# Patient Record
Sex: MALE | Race: WHITE | NOT HISPANIC OR LATINO | URBAN - METROPOLITAN AREA
[De-identification: names, ages, dates, MRNs, and addresses within clinical notes are randomized per-mention and may not be internally consistent; named-entity substitution may affect disease eponyms.]

---

## 2017-01-14 ENCOUNTER — APPOINTMENT (RX ONLY)
Dept: URBAN - METROPOLITAN AREA CLINIC 343 | Facility: CLINIC | Age: 72
Setting detail: DERMATOLOGY
End: 2017-01-14

## 2017-01-14 DIAGNOSIS — Z85.828 PERSONAL HISTORY OF OTHER MALIGNANT NEOPLASM OF SKIN: ICD-10-CM

## 2017-01-14 DIAGNOSIS — L82.1 OTHER SEBORRHEIC KERATOSIS: ICD-10-CM

## 2017-01-14 DIAGNOSIS — Z87.2 PERSONAL HISTORY OF DISEASES OF THE SKIN AND SUBCUTANEOUS TISSUE: ICD-10-CM

## 2017-01-14 DIAGNOSIS — L82.0 INFLAMED SEBORRHEIC KERATOSIS: ICD-10-CM

## 2017-01-14 DIAGNOSIS — D22 MELANOCYTIC NEVI: ICD-10-CM

## 2017-01-14 DIAGNOSIS — L57.0 ACTINIC KERATOSIS: ICD-10-CM

## 2017-01-14 PROBLEM — D22.5 MELANOCYTIC NEVI OF TRUNK: Status: ACTIVE | Noted: 2017-01-14

## 2017-01-14 PROBLEM — D48.5 NEOPLASM OF UNCERTAIN BEHAVIOR OF SKIN: Status: ACTIVE | Noted: 2017-01-14

## 2017-01-14 PROBLEM — I10 ESSENTIAL (PRIMARY) HYPERTENSION: Status: ACTIVE | Noted: 2017-01-14

## 2017-01-14 PROCEDURE — 99213 OFFICE O/P EST LOW 20 MIN: CPT | Mod: 25

## 2017-01-14 PROCEDURE — 11101: CPT

## 2017-01-14 PROCEDURE — 11100: CPT | Mod: 59

## 2017-01-14 PROCEDURE — 17003 DESTRUCT PREMALG LES 2-14: CPT

## 2017-01-14 PROCEDURE — 17000 DESTRUCT PREMALG LESION: CPT | Mod: 59

## 2017-01-14 PROCEDURE — ? COUNSELING

## 2017-01-14 PROCEDURE — 17110 DESTRUCTION B9 LES UP TO 14: CPT

## 2017-01-14 PROCEDURE — ? BIOPSY BY SHAVE METHOD

## 2017-01-14 PROCEDURE — ? LIQUID NITROGEN

## 2017-01-14 ASSESSMENT — LOCATION SIMPLE DESCRIPTION DERM
LOCATION SIMPLE: LEFT FOREHEAD
LOCATION SIMPLE: LEFT ELBOW
LOCATION SIMPLE: RIGHT FOREHEAD
LOCATION SIMPLE: LEFT POPLITEAL SKIN
LOCATION SIMPLE: ABDOMEN
LOCATION SIMPLE: RIGHT EYEBROW
LOCATION SIMPLE: SCALP
LOCATION SIMPLE: LEFT SCALP

## 2017-01-14 ASSESSMENT — LOCATION ZONE DERM
LOCATION ZONE: FACE
LOCATION ZONE: ARM
LOCATION ZONE: SCALP
LOCATION ZONE: TRUNK
LOCATION ZONE: LEG

## 2017-01-14 ASSESSMENT — LOCATION DETAILED DESCRIPTION DERM
LOCATION DETAILED: LEFT INFERIOR FOREHEAD
LOCATION DETAILED: RIGHT INFERIOR LATERAL FOREHEAD
LOCATION DETAILED: LEFT SUPERIOR FOREHEAD
LOCATION DETAILED: LEFT ELBOW
LOCATION DETAILED: EPIGASTRIC SKIN
LOCATION DETAILED: RIGHT LATERAL EYEBROW
LOCATION DETAILED: PERIUMBILICAL SKIN
LOCATION DETAILED: LEFT POPLITEAL SKIN
LOCATION DETAILED: RIGHT SUPERIOR FOREHEAD
LOCATION DETAILED: LEFT SUPERIOR PARIETAL SCALP
LOCATION DETAILED: LEFT MEDIAL FRONTAL SCALP

## 2017-01-14 NOTE — PROCEDURE: BIOPSY BY SHAVE METHOD
Body Location Override (Optional - Billing Will Still Be Based On Selected Body Map Location If Applicable): left lateral nasal ala

## 2017-01-14 NOTE — PROCEDURE: BIOPSY BY SHAVE METHOD
Body Location Override (Optional - Billing Will Still Be Based On Selected Body Map Location If Applicable): left posterior lateral thigh close to popliteal fossa

## 2017-01-14 NOTE — PROCEDURE: BIOPSY BY SHAVE METHOD
Body Location Override (Optional - Billing Will Still Be Based On Selected Body Map Location If Applicable): left shoulder

## 2017-01-14 NOTE — PROCEDURE: LIQUID NITROGEN
Number Of Freeze-Thaw Cycles: 1 freeze-thaw cycle
Include Z78.9 (Other Specified Conditions Influencing Health Status) As An Associated Diagnosis?: No
Medical Necessity Clause: This procedure was medically necessary because the lesions that were treated were:
Medical Necessity Information: It is in your best interest to select a reason for this procedure from the list below. All of these items fulfill various CMS LCD requirements except the new and changing color options.
Post-Care Instructions: I reviewed with the patient in detail post-care instructions. Patient is to wear sunprotection, and avoid picking at any of the treated lesions. Pt may apply Vaseline to crusted or scabbing areas.
Render Post-Care Instructions In Note?: yes
Detail Level: Detailed
Consent: The patient's consent was obtained including but not limited to risks of crusting, scabbing, blistering, scarring, darker or lighter pigmentary change, recurrence, incomplete removal and infection.
Duration Of Freeze Thaw-Cycle (Seconds): 5

## 2017-01-14 NOTE — PROCEDURE: BIOPSY BY SHAVE METHOD
Post-Care Instructions: I reviewed with the patient in detail post-care instructions. Patient is to keep the biopsy site dry overnight, and then apply Aquaphor ointment twice daily until healed.

## 2017-02-07 ENCOUNTER — APPOINTMENT (RX ONLY)
Dept: URBAN - METROPOLITAN AREA CLINIC 343 | Facility: CLINIC | Age: 72
Setting detail: DERMATOLOGY
End: 2017-02-07

## 2017-02-07 DIAGNOSIS — L57.0 ACTINIC KERATOSIS: ICD-10-CM

## 2017-02-07 PROBLEM — C44.619 BASAL CELL CARCINOMA OF SKIN OF LEFT UPPER LIMB, INCLUDING SHOULDER: Status: ACTIVE | Noted: 2017-02-07

## 2017-02-07 PROBLEM — I10 ESSENTIAL (PRIMARY) HYPERTENSION: Status: ACTIVE | Noted: 2017-02-07

## 2017-02-07 PROCEDURE — ? LIQUID NITROGEN

## 2017-02-07 PROCEDURE — 17000 DESTRUCT PREMALG LESION: CPT

## 2017-02-07 PROCEDURE — 17262 DSTRJ MAL LES T/A/L 1.1-2.0: CPT | Mod: 59

## 2017-02-07 PROCEDURE — ? CURETTAGE AND DESTRUCTION

## 2017-02-07 ASSESSMENT — LOCATION SIMPLE DESCRIPTION DERM: LOCATION SIMPLE: LEFT NOSE

## 2017-02-07 ASSESSMENT — LOCATION DETAILED DESCRIPTION DERM: LOCATION DETAILED: LEFT NASAL ALA

## 2017-02-07 ASSESSMENT — LOCATION ZONE DERM: LOCATION ZONE: NOSE

## 2017-02-07 NOTE — PROCEDURE: LIQUID NITROGEN
Duration Of Freeze Thaw-Cycle (Seconds): 5
Render Post-Care Instructions In Note?: no
Consent: The patient's consent was obtained including but not limited to risks of crusting, scabbing, blistering, scarring, darker or lighter pigmentary change, recurrence, incomplete removal and infection.
Detail Level: Detailed
Post-Care Instructions: I reviewed with the patient in detail post-care instructions. Patient is to wear sunprotection, and avoid picking at any of the treated lesions. Pt may apply Vaseline to crusted or scabbing areas.

## 2017-02-07 NOTE — PROCEDURE: CURETTAGE AND DESTRUCTION
Render Post-Care Instructions In Note?: yes
Post-Care Instructions: I reviewed with the patient in detail post-care instructions. Patient is to keep the area dry for 48 hours, and not to engage in any swimming until the area is healed. Should the patient develop any fevers, chills, bleeding, severe pain patient will contact the office immediately.
What Was Performed First?: Curettage
Anesthesia Type: 1% lidocaine with epinephrine
Cautery Type: electrodesiccation
Bill For Surgical Tray: no
Size Of Lesion In Cm: 0.7
Number Of Curettages: 3
Additional Information: (Optional): The wound was cleaned, Aquaphor ointment and bandage was applied.  The patient received detailed post-op instructions.
Size Of Lesion After Curettage: 1.4
Bill As A Line Item Or As Units: Line Item
Body Location Override (Optional - Billing Will Still Be Based On Selected Body Map Location If Applicable): Left shoulder
Detail Level: Detailed
Consent was obtained from the patient. The risks, benefits and alternatives to therapy were discussed in detail. Specifically, the risks of infection, scarring, bleeding, prolonged wound healing, nerve injury, incomplete removal, allergy to anesthesia and recurrence were addressed. Alternatives to ED&C, such as: surgical removal and XRT were also discussed.  Prior to the procedure, the treatment site was clearly identified and confirmed by the patient. All components of Universal Protocol/PAUSE Rule completed.

## 2017-11-06 ENCOUNTER — APPOINTMENT (RX ONLY)
Dept: URBAN - METROPOLITAN AREA CLINIC 343 | Facility: CLINIC | Age: 72
Setting detail: DERMATOLOGY
End: 2017-11-06

## 2017-11-06 DIAGNOSIS — Z85.828 PERSONAL HISTORY OF OTHER MALIGNANT NEOPLASM OF SKIN: ICD-10-CM

## 2017-11-06 DIAGNOSIS — L57.0 ACTINIC KERATOSIS: ICD-10-CM

## 2017-11-06 DIAGNOSIS — D22 MELANOCYTIC NEVI: ICD-10-CM

## 2017-11-06 DIAGNOSIS — L82.1 OTHER SEBORRHEIC KERATOSIS: ICD-10-CM

## 2017-11-06 PROBLEM — D48.5 NEOPLASM OF UNCERTAIN BEHAVIOR OF SKIN: Status: ACTIVE | Noted: 2017-11-06

## 2017-11-06 PROBLEM — D22.5 MELANOCYTIC NEVI OF TRUNK: Status: ACTIVE | Noted: 2017-11-06

## 2017-11-06 PROCEDURE — 11101: CPT

## 2017-11-06 PROCEDURE — 69100 BIOPSY OF EXTERNAL EAR: CPT | Mod: 59

## 2017-11-06 PROCEDURE — ? BIOPSY BY SHAVE METHOD

## 2017-11-06 PROCEDURE — ? COUNSELING

## 2017-11-06 PROCEDURE — 17003 DESTRUCT PREMALG LES 2-14: CPT

## 2017-11-06 PROCEDURE — ? LIQUID NITROGEN

## 2017-11-06 PROCEDURE — 11100: CPT | Mod: 59

## 2017-11-06 PROCEDURE — 99213 OFFICE O/P EST LOW 20 MIN: CPT | Mod: 25

## 2017-11-06 PROCEDURE — 17000 DESTRUCT PREMALG LESION: CPT

## 2017-11-06 ASSESSMENT — LOCATION DETAILED DESCRIPTION DERM
LOCATION DETAILED: LEFT CENTRAL FRONTAL SCALP
LOCATION DETAILED: LEFT SUPERIOR PREAURICULAR CHEEK
LOCATION DETAILED: LEFT INFERIOR HELIX
LOCATION DETAILED: RIGHT SUPERIOR VERMILION LIP
LOCATION DETAILED: POSTERIOR MID-PARIETAL SCALP
LOCATION DETAILED: LEFT MID-UPPER BACK
LOCATION DETAILED: LEFT MEDIAL SUPERIOR CHEST

## 2017-11-06 ASSESSMENT — LOCATION SIMPLE DESCRIPTION DERM
LOCATION SIMPLE: LEFT EAR
LOCATION SIMPLE: RIGHT LIP
LOCATION SIMPLE: LEFT SCALP
LOCATION SIMPLE: CHEST
LOCATION SIMPLE: LEFT UPPER BACK
LOCATION SIMPLE: LEFT CHEEK
LOCATION SIMPLE: POSTERIOR SCALP

## 2017-11-06 ASSESSMENT — LOCATION ZONE DERM
LOCATION ZONE: LIP
LOCATION ZONE: TRUNK
LOCATION ZONE: SCALP
LOCATION ZONE: EAR
LOCATION ZONE: FACE

## 2017-11-06 NOTE — PROCEDURE: BIOPSY BY SHAVE METHOD
Body Location Override (Optional - Billing Will Still Be Based On Selected Body Map Location If Applicable): left chest

## 2017-11-15 ENCOUNTER — ESTABLISHED COMPREHENSIVE EXAM (OUTPATIENT)
Dept: URBAN - METROPOLITAN AREA CLINIC 36 | Facility: CLINIC | Age: 72
End: 2017-11-15

## 2017-11-15 DIAGNOSIS — H26.493: ICD-10-CM

## 2017-11-15 DIAGNOSIS — H04.123: ICD-10-CM

## 2017-11-15 PROCEDURE — G8427 DOCREV CUR MEDS BY ELIG CLIN: HCPCS

## 2017-11-15 PROCEDURE — 92014 COMPRE OPH EXAM EST PT 1/>: CPT

## 2017-11-15 PROCEDURE — 1036F TOBACCO NON-USER: CPT

## 2017-11-15 PROCEDURE — 92015 DETERMINE REFRACTIVE STATE: CPT

## 2017-11-15 ASSESSMENT — VISUAL ACUITY
OS_SC: 20/25-1
OD_CC: J1
OD_SC: 20/25-1
OS_CC: J2
OS_SC: J5
OD_SC: J3

## 2017-11-15 ASSESSMENT — TONOMETRY
OD_IOP_MMHG: 10
OS_IOP_MMHG: 10

## 2017-11-21 ENCOUNTER — APPOINTMENT (RX ONLY)
Dept: URBAN - METROPOLITAN AREA CLINIC 343 | Facility: CLINIC | Age: 72
Setting detail: DERMATOLOGY
End: 2017-11-21

## 2017-11-21 PROBLEM — D04.21 CARCINOMA IN SITU OF SKIN OF RIGHT EAR AND EXTERNAL AURICULAR CANAL: Status: ACTIVE | Noted: 2017-11-21

## 2017-11-21 PROCEDURE — ? MOHS SURGERY

## 2017-11-21 PROCEDURE — ? PRESCRIPTION

## 2017-11-21 PROCEDURE — 17311 MOHS 1 STAGE H/N/HF/G: CPT

## 2017-11-21 PROCEDURE — ? CONSULTATION FOR MOHS SURGERY

## 2017-11-21 PROCEDURE — 99212 OFFICE O/P EST SF 10 MIN: CPT | Mod: 25

## 2017-11-21 PROCEDURE — 13152 CMPLX RPR E/N/E/L 2.6-7.5 CM: CPT

## 2017-11-21 RX ORDER — MUPIROCIN 20 MG/G
1 OINTMENT TOPICAL BID
Qty: 1 | Refills: 1 | Status: ERX | COMMUNITY
Start: 2017-11-21

## 2017-11-21 RX ADMIN — MUPIROCIN 1: 20 OINTMENT TOPICAL at 20:58

## 2017-11-21 NOTE — PROCEDURE: CONSULTATION FOR MOHS SURGERY
Incorporate Mauc In Note: Yes
X Size Of Lesion In Cm (Optional): 0.4
Location Indication Override (Is Already Calculated Based On Selected Body Location): Area H
Size Of Lesion: 0.7
Detail Level: Detailed
Body Location Override (Optional - Billing Will Still Be Based On Selected Body Map Location If Applicable): right ear helix

## 2017-11-28 ENCOUNTER — APPOINTMENT (RX ONLY)
Dept: URBAN - METROPOLITAN AREA CLINIC 343 | Facility: CLINIC | Age: 72
Setting detail: DERMATOLOGY
End: 2017-11-28

## 2017-11-28 PROBLEM — C44.311 BASAL CELL CARCINOMA OF SKIN OF NOSE: Status: ACTIVE | Noted: 2017-11-28

## 2017-11-28 PROBLEM — I10 ESSENTIAL (PRIMARY) HYPERTENSION: Status: ACTIVE | Noted: 2017-11-28

## 2017-11-28 PROCEDURE — 17311 MOHS 1 STAGE H/N/HF/G: CPT | Mod: 79

## 2017-11-28 PROCEDURE — ? CONSULTATION FOR MOHS SURGERY

## 2017-11-28 PROCEDURE — 99212 OFFICE O/P EST SF 10 MIN: CPT | Mod: 24,25

## 2017-11-28 PROCEDURE — ? MOHS SURGERY

## 2017-11-28 NOTE — PROCEDURE: MOHS SURGERY
no A-T Advancement Flap Text: The defect edges were debeveled with a #15 scalpel blade.  Given the location of the defect, shape of the defect and the proximity to free margins an A-T advancement flap was deemed most appropriate.  Using a sterile surgical marker, an appropriate advancement flap was drawn incorporating the defect and placing the expected incisions within the relaxed skin tension lines where possible.    The area thus outlined was incised deep to adipose tissue with a #15 scalpel blade.  The skin margins were undermined to an appropriate distance in all directions utilizing iris scissors.

## 2017-11-28 NOTE — PROCEDURE: CONSULTATION FOR MOHS SURGERY
Location Indication Override (Is Already Calculated Based On Selected Body Location): Area H
Detail Level: Detailed
X Size Of Lesion In Cm (Optional): 0.8
Incorporate Mauc In Note: Yes
Body Location Override (Optional - Billing Will Still Be Based On Selected Body Map Location If Applicable): left nose

## 2017-11-30 ENCOUNTER — CONSULT (OUTPATIENT)
Dept: URBAN - METROPOLITAN AREA CLINIC 43 | Facility: CLINIC | Age: 72
End: 2017-11-30

## 2017-11-30 DIAGNOSIS — H26.493: ICD-10-CM

## 2017-11-30 PROCEDURE — G8427 DOCREV CUR MEDS BY ELIG CLIN: HCPCS

## 2017-11-30 PROCEDURE — G8785 BP SCRN NO PERF AT INTERVAL: HCPCS

## 2017-11-30 PROCEDURE — 92014 COMPRE OPH EXAM EST PT 1/>: CPT

## 2017-11-30 PROCEDURE — 1036F TOBACCO NON-USER: CPT

## 2017-11-30 ASSESSMENT — TONOMETRY
OD_IOP_MMHG: 12
OS_IOP_MMHG: 11

## 2017-11-30 ASSESSMENT — VISUAL ACUITY
OS_BAT: 20/50
OD_SC: J2
OD_BAT: 20/50
OS_SC: J5
OS_SC: 20/40+2
OD_SC: 20/30-1

## 2017-12-05 ENCOUNTER — APPOINTMENT (RX ONLY)
Dept: URBAN - METROPOLITAN AREA CLINIC 343 | Facility: CLINIC | Age: 72
Setting detail: DERMATOLOGY
End: 2017-12-05

## 2017-12-05 DIAGNOSIS — Z48.02 ENCOUNTER FOR REMOVAL OF SUTURES: ICD-10-CM

## 2017-12-05 DIAGNOSIS — S31000A OPEN WOUND(S) (MULTIPLE) OF UNSPECIFIED SITE(S), WITHOUT MENTION OF COMPLICATION: ICD-10-CM

## 2017-12-05 PROBLEM — C44.519 BASAL CELL CARCINOMA OF SKIN OF OTHER PART OF TRUNK: Status: ACTIVE | Noted: 2017-12-05

## 2017-12-05 PROBLEM — Z85.828 PERSONAL HISTORY OF OTHER MALIGNANT NEOPLASM OF SKIN: Status: ACTIVE | Noted: 2017-12-05

## 2017-12-05 PROBLEM — D48.5 NEOPLASM OF UNCERTAIN BEHAVIOR OF SKIN: Status: ACTIVE | Noted: 2017-12-05

## 2017-12-05 PROBLEM — L57.0 ACTINIC KERATOSIS: Status: ACTIVE | Noted: 2017-12-05

## 2017-12-05 PROBLEM — I10 ESSENTIAL (PRIMARY) HYPERTENSION: Status: ACTIVE | Noted: 2017-12-05

## 2017-12-05 PROBLEM — S01.20XA UNSPECIFIED OPEN WOUND OF NOSE, INITIAL ENCOUNTER: Status: ACTIVE | Noted: 2017-12-05

## 2017-12-05 PROCEDURE — ? COUNSELING

## 2017-12-05 PROCEDURE — 99213 OFFICE O/P EST LOW 20 MIN: CPT | Mod: 25

## 2017-12-05 PROCEDURE — 17262 DSTRJ MAL LES T/A/L 1.1-2.0: CPT

## 2017-12-05 PROCEDURE — ? SUTURE REMOVAL (GLOBAL PERIOD)

## 2017-12-05 PROCEDURE — ? TREATMENT REGIMEN

## 2017-12-05 PROCEDURE — ? CURETTAGE AND DESTRUCTION

## 2017-12-05 ASSESSMENT — LOCATION SIMPLE DESCRIPTION DERM
LOCATION SIMPLE: RIGHT EAR
LOCATION SIMPLE: LEFT NOSE

## 2017-12-05 ASSESSMENT — LOCATION DETAILED DESCRIPTION DERM
LOCATION DETAILED: LEFT NASAL ALA
LOCATION DETAILED: RIGHT SUPERIOR HELIX

## 2017-12-05 ASSESSMENT — LOCATION ZONE DERM
LOCATION ZONE: NOSE
LOCATION ZONE: EAR

## 2017-12-05 NOTE — PROCEDURE: TREATMENT REGIMEN
Detail Level: Simple
Plan: Patient is to contact the office if any problems occur for further evaluation and management. Will recheck in four weeks
Continue Regimen: Washing site with antibacterial soap and water, apply Mupirocin ointment and a bandaid to the site until healed.

## 2017-12-05 NOTE — PROCEDURE: CURETTAGE AND DESTRUCTION
Bill As A Line Item Or As Units: Units
Number Of Curettages: 3
Anesthesia Type: 1% lidocaine with epinephrine
Post-Care Instructions: I reviewed with the patient in detail post-care instructions. Patient is to keep the area dry for 48 hours, and not to engage in any swimming until the area is healed. Should the patient develop any fevers, chills, bleeding, severe pain patient will contact the office immediately.
Additional Information: (Optional): The wound was cleaned, Mupirocin ointment applied. The patient received detailed post-op instructions.
Body Location Override (Optional - Billing Will Still Be Based On Selected Body Map Location If Applicable): left chest
Size Of Lesion After Curettage: 1.4
Cautery Type: electrodesiccation
Render Post-Care Instructions In Note?: no
What Was Performed First?: Curettage
Consent was obtained from the patient. The risks, benefits and alternatives to therapy were discussed in detail. Specifically, the risks of infection, scarring, bleeding, prolonged wound healing, nerve injury, incomplete removal, allergy to anesthesia and recurrence were addressed. Alternatives to ED&C, such as: surgical removal and XRT were also discussed.  Prior to the procedure, the treatment site was clearly identified and confirmed by the patient. All components of Universal Protocol/PAUSE Rule completed.
Detail Level: Detailed
Size Of Lesion In Cm: 0.6

## 2017-12-05 NOTE — PROCEDURE: SUTURE REMOVAL (GLOBAL PERIOD)
Add 73123 Cpt? (Important Note: In 2017 The Use Of 44133 Is Being Tracked By Cms To Determine Future Global Period Reimbursement For Global Periods): no
Detail Level: Simple

## 2018-01-15 ENCOUNTER — SURGERY/PROCEDURE (OUTPATIENT)
Dept: URBAN - METROPOLITAN AREA SURGERY 14 | Facility: SURGERY | Age: 73
End: 2018-01-15

## 2018-01-15 DIAGNOSIS — H26.492: ICD-10-CM

## 2018-01-15 PROCEDURE — 66821 AFTER CATARACT LASER SURGERY: CPT

## 2018-01-22 ENCOUNTER — SURGERY/PROCEDURE (OUTPATIENT)
Dept: URBAN - METROPOLITAN AREA SURGERY 14 | Facility: SURGERY | Age: 73
End: 2018-01-22

## 2018-01-22 DIAGNOSIS — H26.491: ICD-10-CM

## 2018-01-22 PROCEDURE — 66821 AFTER CATARACT LASER SURGERY: CPT

## 2018-01-30 ENCOUNTER — POST-OP (OUTPATIENT)
Dept: URBAN - METROPOLITAN AREA CLINIC 36 | Facility: CLINIC | Age: 73
End: 2018-01-30

## 2018-01-30 DIAGNOSIS — Z96.1: ICD-10-CM

## 2018-01-30 PROCEDURE — 99024 POSTOP FOLLOW-UP VISIT: CPT

## 2018-01-30 ASSESSMENT — VISUAL ACUITY
OS_SC: J5
OD_SC: 20/25
OD_SC: J3
OS_SC: 20/25

## 2018-01-30 ASSESSMENT — TONOMETRY
OS_IOP_MMHG: 11
OD_IOP_MMHG: 11

## 2018-11-16 ENCOUNTER — ESTABLISHED COMPREHENSIVE EXAM (OUTPATIENT)
Dept: URBAN - METROPOLITAN AREA CLINIC 36 | Facility: CLINIC | Age: 73
End: 2018-11-16

## 2018-11-16 DIAGNOSIS — H04.123: ICD-10-CM

## 2018-11-16 DIAGNOSIS — H43.393: ICD-10-CM

## 2018-11-16 PROCEDURE — 92015 DETERMINE REFRACTIVE STATE: CPT

## 2018-11-16 PROCEDURE — 92014 COMPRE OPH EXAM EST PT 1/>: CPT

## 2018-11-16 ASSESSMENT — TONOMETRY
OS_IOP_MMHG: 10
OD_IOP_MMHG: 10

## 2018-11-16 ASSESSMENT — VISUAL ACUITY
OS_SC: J3
OD_SC: J3
OS_SC: 20/25+2
OS_CC: J1
OD_SC: 20/30-1
OD_CC: J1+

## 2019-11-18 ENCOUNTER — ESTABLISHED PATIENT (OUTPATIENT)
Dept: URBAN - METROPOLITAN AREA CLINIC 36 | Facility: CLINIC | Age: 74
End: 2019-11-18

## 2019-11-18 DIAGNOSIS — H11.042: ICD-10-CM

## 2019-11-18 DIAGNOSIS — H43.393: ICD-10-CM

## 2019-11-18 DIAGNOSIS — H04.123: ICD-10-CM

## 2019-11-18 PROCEDURE — 92014 COMPRE OPH EXAM EST PT 1/>: CPT

## 2019-11-18 ASSESSMENT — VISUAL ACUITY
OS_SC: 20/30-1
OD_SC: J3
OD_CC: J2
OS_CC: J1
OS_SC: J3
OD_SC: 20/30-1

## 2019-11-18 ASSESSMENT — TONOMETRY
OS_IOP_MMHG: 08
OD_IOP_MMHG: 08

## 2020-11-18 ENCOUNTER — DILATED FUNDUS EXAM (OUTPATIENT)
Dept: URBAN - METROPOLITAN AREA CLINIC 36 | Facility: CLINIC | Age: 75
End: 2020-11-18

## 2020-11-18 DIAGNOSIS — H43.393: ICD-10-CM

## 2020-11-18 DIAGNOSIS — H11.042: ICD-10-CM

## 2020-11-18 DIAGNOSIS — H04.123: ICD-10-CM

## 2020-11-18 PROCEDURE — 92014 COMPRE OPH EXAM EST PT 1/>: CPT

## 2020-11-18 ASSESSMENT — VISUAL ACUITY
OS_CC: J2
OD_SC: 20/30
OD_CC: J3
OS_SC: J6
OD_SC: J8
OS_SC: 20/20

## 2020-11-18 ASSESSMENT — TONOMETRY
OS_IOP_MMHG: 10
OD_IOP_MMHG: 10

## 2020-11-19 ENCOUNTER — REFRACTION ONLY (OUTPATIENT)
Dept: URBAN - METROPOLITAN AREA CLINIC 36 | Facility: CLINIC | Age: 75
End: 2020-11-19

## 2020-11-19 DIAGNOSIS — H52.7: ICD-10-CM

## 2020-11-19 PROCEDURE — 92015 DETERMINE REFRACTIVE STATE: CPT

## 2020-11-19 ASSESSMENT — VISUAL ACUITY
OS_SC: 20/25+1
OD_SC: 20/25